# Patient Record
Sex: MALE | Race: WHITE | NOT HISPANIC OR LATINO | Employment: OTHER | URBAN - METROPOLITAN AREA
[De-identification: names, ages, dates, MRNs, and addresses within clinical notes are randomized per-mention and may not be internally consistent; named-entity substitution may affect disease eponyms.]

---

## 2023-05-27 ENCOUNTER — HOSPITAL ENCOUNTER (OUTPATIENT)
Dept: RADIOLOGY | Facility: MEDICAL CENTER | Age: 73
End: 2023-05-27

## 2023-05-27 ENCOUNTER — ANESTHESIA (OUTPATIENT)
Dept: SURGERY | Facility: MEDICAL CENTER | Age: 73
DRG: 660 | End: 2023-05-27
Payer: MEDICARE

## 2023-05-27 ENCOUNTER — HOSPITAL ENCOUNTER (INPATIENT)
Facility: MEDICAL CENTER | Age: 73
LOS: 2 days | DRG: 660 | End: 2023-05-29
Attending: STUDENT IN AN ORGANIZED HEALTH CARE EDUCATION/TRAINING PROGRAM | Admitting: STUDENT IN AN ORGANIZED HEALTH CARE EDUCATION/TRAINING PROGRAM
Payer: MEDICARE

## 2023-05-27 ENCOUNTER — ANESTHESIA EVENT (OUTPATIENT)
Dept: SURGERY | Facility: MEDICAL CENTER | Age: 73
DRG: 660 | End: 2023-05-27
Payer: MEDICARE

## 2023-05-27 ENCOUNTER — APPOINTMENT (OUTPATIENT)
Dept: RADIOLOGY | Facility: MEDICAL CENTER | Age: 73
DRG: 660 | End: 2023-05-27
Attending: UROLOGY
Payer: MEDICARE

## 2023-05-27 DIAGNOSIS — N20.1 RIGHT URETERAL STONE: ICD-10-CM

## 2023-05-27 DIAGNOSIS — N39.0 ACUTE UTI: ICD-10-CM

## 2023-05-27 LAB
ALBUMIN SERPL BCP-MCNC: 4.1 G/DL (ref 3.2–4.9)
ALBUMIN/GLOB SERPL: 1.6 G/DL
ALP SERPL-CCNC: 140 U/L (ref 30–99)
ALT SERPL-CCNC: 20 U/L (ref 2–50)
ANION GAP SERPL CALC-SCNC: 16 MMOL/L (ref 7–16)
AST SERPL-CCNC: 17 U/L (ref 12–45)
BASOPHILS # BLD AUTO: 0.1 % (ref 0–1.8)
BASOPHILS # BLD: 0.02 K/UL (ref 0–0.12)
BILIRUB SERPL-MCNC: 1.1 MG/DL (ref 0.1–1.5)
BUN SERPL-MCNC: 22 MG/DL (ref 8–22)
CALCIUM ALBUM COR SERPL-MCNC: 9.1 MG/DL (ref 8.5–10.5)
CALCIUM SERPL-MCNC: 9.2 MG/DL (ref 8.5–10.5)
CHLORIDE SERPL-SCNC: 105 MMOL/L (ref 96–112)
CO2 SERPL-SCNC: 21 MMOL/L (ref 20–33)
CREAT SERPL-MCNC: 1.17 MG/DL (ref 0.5–1.4)
EOSINOPHIL # BLD AUTO: 0.01 K/UL (ref 0–0.51)
EOSINOPHIL NFR BLD: 0.1 % (ref 0–6.9)
ERYTHROCYTE [DISTWIDTH] IN BLOOD BY AUTOMATED COUNT: 41.8 FL (ref 35.9–50)
GFR SERPLBLD CREATININE-BSD FMLA CKD-EPI: 65 ML/MIN/1.73 M 2
GLOBULIN SER CALC-MCNC: 2.6 G/DL (ref 1.9–3.5)
GLUCOSE SERPL-MCNC: 137 MG/DL (ref 65–99)
HCT VFR BLD AUTO: 45.2 % (ref 42–52)
HGB BLD-MCNC: 15.4 G/DL (ref 14–18)
IMM GRANULOCYTES # BLD AUTO: 0.05 K/UL (ref 0–0.11)
IMM GRANULOCYTES NFR BLD AUTO: 0.3 % (ref 0–0.9)
INR PPP: 1.02 (ref 0.87–1.13)
LACTATE SERPL-SCNC: 1.4 MMOL/L (ref 0.5–2)
LACTATE SERPL-SCNC: 1.5 MMOL/L (ref 0.5–2)
LACTATE SERPL-SCNC: 2.5 MMOL/L (ref 0.5–2)
LACTATE SERPL-SCNC: 3.4 MMOL/L (ref 0.5–2)
LYMPHOCYTES # BLD AUTO: 0.81 K/UL (ref 1–4.8)
LYMPHOCYTES NFR BLD: 5.6 % (ref 22–41)
MCH RBC QN AUTO: 29.8 PG (ref 27–33)
MCHC RBC AUTO-ENTMCNC: 34.1 G/DL (ref 32.3–36.5)
MCV RBC AUTO: 87.6 FL (ref 81.4–97.8)
MONOCYTES # BLD AUTO: 0.4 K/UL (ref 0–0.85)
MONOCYTES NFR BLD AUTO: 2.8 % (ref 0–13.4)
NEUTROPHILS # BLD AUTO: 13.16 K/UL (ref 1.82–7.42)
NEUTROPHILS NFR BLD: 91.1 % (ref 44–72)
NRBC # BLD AUTO: 0 K/UL
NRBC BLD-RTO: 0 /100 WBC (ref 0–0.2)
PLATELET # BLD AUTO: 203 K/UL (ref 164–446)
PMV BLD AUTO: 9 FL (ref 9–12.9)
POTASSIUM SERPL-SCNC: 4.4 MMOL/L (ref 3.6–5.5)
PROT SERPL-MCNC: 6.7 G/DL (ref 6–8.2)
PROTHROMBIN TIME: 13.3 SEC (ref 12–14.6)
RBC # BLD AUTO: 5.16 M/UL (ref 4.7–6.1)
SODIUM SERPL-SCNC: 142 MMOL/L (ref 135–145)
WBC # BLD AUTO: 14.5 K/UL (ref 4.8–10.8)

## 2023-05-27 PROCEDURE — 770006 HCHG ROOM/CARE - MED/SURG/GYN SEMI*

## 2023-05-27 PROCEDURE — A9270 NON-COVERED ITEM OR SERVICE: HCPCS | Performed by: STUDENT IN AN ORGANIZED HEALTH CARE EDUCATION/TRAINING PROGRAM

## 2023-05-27 PROCEDURE — 99100 ANES PT EXTEME AGE<1 YR&>70: CPT | Performed by: ANESTHESIOLOGY

## 2023-05-27 PROCEDURE — A9270 NON-COVERED ITEM OR SERVICE: HCPCS | Performed by: ANESTHESIOLOGY

## 2023-05-27 PROCEDURE — 99223 1ST HOSP IP/OBS HIGH 75: CPT | Mod: AI | Performed by: STUDENT IN AN ORGANIZED HEALTH CARE EDUCATION/TRAINING PROGRAM

## 2023-05-27 PROCEDURE — C1769 GUIDE WIRE: HCPCS | Performed by: UROLOGY

## 2023-05-27 PROCEDURE — 160002 HCHG RECOVERY MINUTES (STAT): Performed by: UROLOGY

## 2023-05-27 PROCEDURE — 160048 HCHG OR STATISTICAL LEVEL 1-5: Performed by: UROLOGY

## 2023-05-27 PROCEDURE — 85610 PROTHROMBIN TIME: CPT

## 2023-05-27 PROCEDURE — 99140 ANES COMP EMERGENCY COND: CPT | Performed by: ANESTHESIOLOGY

## 2023-05-27 PROCEDURE — 85025 COMPLETE CBC W/AUTO DIFF WBC: CPT

## 2023-05-27 PROCEDURE — 80053 COMPREHEN METABOLIC PANEL: CPT

## 2023-05-27 PROCEDURE — 700111 HCHG RX REV CODE 636 W/ 250 OVERRIDE (IP): Performed by: STUDENT IN AN ORGANIZED HEALTH CARE EDUCATION/TRAINING PROGRAM

## 2023-05-27 PROCEDURE — 160028 HCHG SURGERY MINUTES - 1ST 30 MINS LEVEL 3: Performed by: UROLOGY

## 2023-05-27 PROCEDURE — 700101 HCHG RX REV CODE 250: Performed by: ANESTHESIOLOGY

## 2023-05-27 PROCEDURE — 700111 HCHG RX REV CODE 636 W/ 250 OVERRIDE (IP): Performed by: ANESTHESIOLOGY

## 2023-05-27 PROCEDURE — C1747 HCHG SHELL REV 278 C1747: HCPCS | Performed by: UROLOGY

## 2023-05-27 PROCEDURE — 700102 HCHG RX REV CODE 250 W/ 637 OVERRIDE(OP): Performed by: ANESTHESIOLOGY

## 2023-05-27 PROCEDURE — 160009 HCHG ANES TIME/MIN: Performed by: UROLOGY

## 2023-05-27 PROCEDURE — 93005 ELECTROCARDIOGRAM TRACING: CPT | Performed by: UROLOGY

## 2023-05-27 PROCEDURE — 36415 COLL VENOUS BLD VENIPUNCTURE: CPT

## 2023-05-27 PROCEDURE — 83605 ASSAY OF LACTIC ACID: CPT | Mod: 91

## 2023-05-27 PROCEDURE — 160035 HCHG PACU - 1ST 60 MINS PHASE I: Performed by: UROLOGY

## 2023-05-27 PROCEDURE — 700102 HCHG RX REV CODE 250 W/ 637 OVERRIDE(OP): Performed by: STUDENT IN AN ORGANIZED HEALTH CARE EDUCATION/TRAINING PROGRAM

## 2023-05-27 PROCEDURE — 87150 DNA/RNA AMPLIFIED PROBE: CPT

## 2023-05-27 PROCEDURE — 700117 HCHG RX CONTRAST REV CODE 255: Performed by: UROLOGY

## 2023-05-27 PROCEDURE — 700105 HCHG RX REV CODE 258: Performed by: ANESTHESIOLOGY

## 2023-05-27 PROCEDURE — 700105 HCHG RX REV CODE 258: Performed by: STUDENT IN AN ORGANIZED HEALTH CARE EDUCATION/TRAINING PROGRAM

## 2023-05-27 PROCEDURE — C2617 STENT, NON-COR, TEM W/O DEL: HCPCS | Performed by: UROLOGY

## 2023-05-27 PROCEDURE — 160039 HCHG SURGERY MINUTES - EA ADDL 1 MIN LEVEL 3: Performed by: UROLOGY

## 2023-05-27 PROCEDURE — 00910 ANES TRANSURETHRAL PX NOS: CPT | Performed by: ANESTHESIOLOGY

## 2023-05-27 PROCEDURE — 87077 CULTURE AEROBIC IDENTIFY: CPT

## 2023-05-27 PROCEDURE — 87040 BLOOD CULTURE FOR BACTERIA: CPT | Mod: 91

## 2023-05-27 PROCEDURE — C1894 INTRO/SHEATH, NON-LASER: HCPCS | Performed by: UROLOGY

## 2023-05-27 PROCEDURE — 0T768DZ DILATION OF RIGHT URETER WITH INTRALUMINAL DEVICE, VIA NATURAL OR ARTIFICIAL OPENING ENDOSCOPIC: ICD-10-PCS | Performed by: UROLOGY

## 2023-05-27 DEVICE — STENT UROLOGICAL POLARIS 6X26  ULTRA: Type: IMPLANTABLE DEVICE | Site: URETER | Status: FUNCTIONAL

## 2023-05-27 RX ORDER — HYDRALAZINE HYDROCHLORIDE 20 MG/ML
20 INJECTION INTRAMUSCULAR; INTRAVENOUS EVERY 6 HOURS PRN
Status: DISCONTINUED | OUTPATIENT
Start: 2023-05-27 | End: 2023-05-29 | Stop reason: HOSPADM

## 2023-05-27 RX ORDER — OXYCODONE HCL 5 MG/5 ML
10 SOLUTION, ORAL ORAL
Status: COMPLETED | OUTPATIENT
Start: 2023-05-27 | End: 2023-05-27

## 2023-05-27 RX ORDER — SODIUM CHLORIDE, SODIUM LACTATE, POTASSIUM CHLORIDE, CALCIUM CHLORIDE 600; 310; 30; 20 MG/100ML; MG/100ML; MG/100ML; MG/100ML
INJECTION, SOLUTION INTRAVENOUS
Status: DISCONTINUED | OUTPATIENT
Start: 2023-05-27 | End: 2023-05-27 | Stop reason: SURG

## 2023-05-27 RX ORDER — TAMSULOSIN HYDROCHLORIDE 0.4 MG/1
0.4 CAPSULE ORAL
Status: DISCONTINUED | OUTPATIENT
Start: 2023-05-27 | End: 2023-05-29 | Stop reason: HOSPADM

## 2023-05-27 RX ORDER — PHENYLEPHRINE HCL IN 0.9% NACL 0.5 MG/5ML
SYRINGE (ML) INTRAVENOUS PRN
Status: DISCONTINUED | OUTPATIENT
Start: 2023-05-27 | End: 2023-05-27 | Stop reason: SURG

## 2023-05-27 RX ORDER — DIPHENHYDRAMINE HYDROCHLORIDE 50 MG/ML
12.5 INJECTION INTRAMUSCULAR; INTRAVENOUS
Status: DISCONTINUED | OUTPATIENT
Start: 2023-05-27 | End: 2023-05-27 | Stop reason: HOSPADM

## 2023-05-27 RX ORDER — ONDANSETRON 2 MG/ML
4 INJECTION INTRAMUSCULAR; INTRAVENOUS EVERY 4 HOURS PRN
Status: DISCONTINUED | OUTPATIENT
Start: 2023-05-27 | End: 2023-05-29 | Stop reason: HOSPADM

## 2023-05-27 RX ORDER — DEXAMETHASONE SODIUM PHOSPHATE 4 MG/ML
INJECTION, SOLUTION INTRA-ARTICULAR; INTRALESIONAL; INTRAMUSCULAR; INTRAVENOUS; SOFT TISSUE PRN
Status: DISCONTINUED | OUTPATIENT
Start: 2023-05-27 | End: 2023-05-27 | Stop reason: SURG

## 2023-05-27 RX ORDER — HYDROMORPHONE HYDROCHLORIDE 1 MG/ML
1 INJECTION, SOLUTION INTRAMUSCULAR; INTRAVENOUS; SUBCUTANEOUS
Status: DISCONTINUED | OUTPATIENT
Start: 2023-05-27 | End: 2023-05-29 | Stop reason: HOSPADM

## 2023-05-27 RX ORDER — AMOXICILLIN 250 MG
2 CAPSULE ORAL 2 TIMES DAILY
Status: DISCONTINUED | OUTPATIENT
Start: 2023-05-27 | End: 2023-05-29 | Stop reason: HOSPADM

## 2023-05-27 RX ORDER — CEFAZOLIN SODIUM 1 G/3ML
INJECTION, POWDER, FOR SOLUTION INTRAMUSCULAR; INTRAVENOUS PRN
Status: DISCONTINUED | OUTPATIENT
Start: 2023-05-27 | End: 2023-05-27 | Stop reason: SURG

## 2023-05-27 RX ORDER — SODIUM CHLORIDE 9 MG/ML
INJECTION, SOLUTION INTRAVENOUS CONTINUOUS
Status: DISCONTINUED | OUTPATIENT
Start: 2023-05-27 | End: 2023-05-29 | Stop reason: HOSPADM

## 2023-05-27 RX ORDER — ATORVASTATIN CALCIUM 40 MG/1
40 TABLET, FILM COATED ORAL NIGHTLY
Status: DISCONTINUED | OUTPATIENT
Start: 2023-05-27 | End: 2023-05-29 | Stop reason: HOSPADM

## 2023-05-27 RX ORDER — ACETAMINOPHEN 500 MG
1000 TABLET ORAL EVERY 4 HOURS PRN
Status: DISCONTINUED | OUTPATIENT
Start: 2023-05-27 | End: 2023-05-29 | Stop reason: HOSPADM

## 2023-05-27 RX ORDER — CARVEDILOL 3.12 MG/1
3.12 TABLET ORAL 2 TIMES DAILY WITH MEALS
Status: DISCONTINUED | OUTPATIENT
Start: 2023-05-27 | End: 2023-05-29 | Stop reason: HOSPADM

## 2023-05-27 RX ORDER — LISINOPRIL 5 MG/1
5 TABLET ORAL DAILY
Status: DISCONTINUED | OUTPATIENT
Start: 2023-05-27 | End: 2023-05-29 | Stop reason: HOSPADM

## 2023-05-27 RX ORDER — ALLOPURINOL 100 MG/1
300 TABLET ORAL DAILY
Status: DISCONTINUED | OUTPATIENT
Start: 2023-05-27 | End: 2023-05-29 | Stop reason: HOSPADM

## 2023-05-27 RX ORDER — ONDANSETRON 2 MG/ML
4 INJECTION INTRAMUSCULAR; INTRAVENOUS
Status: DISCONTINUED | OUTPATIENT
Start: 2023-05-27 | End: 2023-05-27 | Stop reason: HOSPADM

## 2023-05-27 RX ORDER — EPHEDRINE SULFATE 50 MG/ML
5 INJECTION, SOLUTION INTRAVENOUS
Status: DISCONTINUED | OUTPATIENT
Start: 2023-05-27 | End: 2023-05-27 | Stop reason: HOSPADM

## 2023-05-27 RX ORDER — LABETALOL HYDROCHLORIDE 5 MG/ML
5 INJECTION, SOLUTION INTRAVENOUS
Status: DISCONTINUED | OUTPATIENT
Start: 2023-05-27 | End: 2023-05-27 | Stop reason: HOSPADM

## 2023-05-27 RX ORDER — BISACODYL 10 MG
10 SUPPOSITORY, RECTAL RECTAL
Status: DISCONTINUED | OUTPATIENT
Start: 2023-05-27 | End: 2023-05-29 | Stop reason: HOSPADM

## 2023-05-27 RX ORDER — POLYETHYLENE GLYCOL 3350 17 G/17G
1 POWDER, FOR SOLUTION ORAL
Status: DISCONTINUED | OUTPATIENT
Start: 2023-05-27 | End: 2023-05-29 | Stop reason: HOSPADM

## 2023-05-27 RX ORDER — SODIUM CHLORIDE, SODIUM LACTATE, POTASSIUM CHLORIDE, CALCIUM CHLORIDE 600; 310; 30; 20 MG/100ML; MG/100ML; MG/100ML; MG/100ML
INJECTION, SOLUTION INTRAVENOUS CONTINUOUS
Status: DISCONTINUED | OUTPATIENT
Start: 2023-05-27 | End: 2023-05-27 | Stop reason: HOSPADM

## 2023-05-27 RX ORDER — HALOPERIDOL 5 MG/ML
1 INJECTION INTRAMUSCULAR
Status: DISCONTINUED | OUTPATIENT
Start: 2023-05-27 | End: 2023-05-27 | Stop reason: HOSPADM

## 2023-05-27 RX ORDER — OXYCODONE HCL 5 MG/5 ML
5 SOLUTION, ORAL ORAL
Status: COMPLETED | OUTPATIENT
Start: 2023-05-27 | End: 2023-05-27

## 2023-05-27 RX ORDER — LIDOCAINE HYDROCHLORIDE 20 MG/ML
INJECTION, SOLUTION EPIDURAL; INFILTRATION; INTRACAUDAL; PERINEURAL PRN
Status: DISCONTINUED | OUTPATIENT
Start: 2023-05-27 | End: 2023-05-27 | Stop reason: SURG

## 2023-05-27 RX ORDER — VASOPRESSIN 20 U/ML
INJECTION PARENTERAL PRN
Status: DISCONTINUED | OUTPATIENT
Start: 2023-05-27 | End: 2023-05-27 | Stop reason: SURG

## 2023-05-27 RX ORDER — ONDANSETRON 2 MG/ML
INJECTION INTRAMUSCULAR; INTRAVENOUS PRN
Status: DISCONTINUED | OUTPATIENT
Start: 2023-05-27 | End: 2023-05-27 | Stop reason: SURG

## 2023-05-27 RX ORDER — ONDANSETRON 4 MG/1
4 TABLET, ORALLY DISINTEGRATING ORAL EVERY 4 HOURS PRN
Status: DISCONTINUED | OUTPATIENT
Start: 2023-05-27 | End: 2023-05-29 | Stop reason: HOSPADM

## 2023-05-27 RX ORDER — MIDAZOLAM HYDROCHLORIDE 1 MG/ML
INJECTION INTRAMUSCULAR; INTRAVENOUS PRN
Status: DISCONTINUED | OUTPATIENT
Start: 2023-05-27 | End: 2023-05-27 | Stop reason: SURG

## 2023-05-27 RX ORDER — HYDRALAZINE HYDROCHLORIDE 20 MG/ML
5 INJECTION INTRAMUSCULAR; INTRAVENOUS
Status: DISCONTINUED | OUTPATIENT
Start: 2023-05-27 | End: 2023-05-27 | Stop reason: HOSPADM

## 2023-05-27 RX ADMIN — CEFAZOLIN 2 G: 1 INJECTION, POWDER, FOR SOLUTION INTRAMUSCULAR; INTRAVENOUS at 14:30

## 2023-05-27 RX ADMIN — OXYCODONE HYDROCHLORIDE 5 MG: 5 SOLUTION ORAL at 15:35

## 2023-05-27 RX ADMIN — DEXAMETHASONE SODIUM PHOSPHATE 8 MG: 4 INJECTION INTRA-ARTICULAR; INTRALESIONAL; INTRAMUSCULAR; INTRAVENOUS; SOFT TISSUE at 14:33

## 2023-05-27 RX ADMIN — Medication 200 MCG: at 14:37

## 2023-05-27 RX ADMIN — SODIUM CHLORIDE, POTASSIUM CHLORIDE, SODIUM LACTATE AND CALCIUM CHLORIDE: 600; 310; 30; 20 INJECTION, SOLUTION INTRAVENOUS at 14:24

## 2023-05-27 RX ADMIN — VASOPRESSIN 1 UNITS: 20 INJECTION INTRAVENOUS at 14:39

## 2023-05-27 RX ADMIN — SODIUM CHLORIDE: 9 INJECTION, SOLUTION INTRAVENOUS at 06:17

## 2023-05-27 RX ADMIN — FENTANYL CITRATE 50 MCG: 50 INJECTION, SOLUTION INTRAMUSCULAR; INTRAVENOUS at 14:27

## 2023-05-27 RX ADMIN — CARVEDILOL 3.12 MG: 3.12 TABLET, FILM COATED ORAL at 09:13

## 2023-05-27 RX ADMIN — MIDAZOLAM 2 MG: 1 INJECTION, SOLUTION INTRAMUSCULAR; INTRAVENOUS at 14:27

## 2023-05-27 RX ADMIN — SODIUM CHLORIDE: 9 INJECTION, SOLUTION INTRAVENOUS at 22:27

## 2023-05-27 RX ADMIN — FENTANYL CITRATE 50 MCG: 50 INJECTION, SOLUTION INTRAMUSCULAR; INTRAVENOUS at 14:47

## 2023-05-27 RX ADMIN — ONDANSETRON 4 MG: 2 INJECTION INTRAMUSCULAR; INTRAVENOUS at 10:43

## 2023-05-27 RX ADMIN — SODIUM CHLORIDE: 9 INJECTION, SOLUTION INTRAVENOUS at 17:07

## 2023-05-27 RX ADMIN — ALLOPURINOL 300 MG: 100 TABLET ORAL at 08:00

## 2023-05-27 RX ADMIN — ATORVASTATIN CALCIUM 40 MG: 40 TABLET, FILM COATED ORAL at 21:03

## 2023-05-27 RX ADMIN — HYDROMORPHONE HYDROCHLORIDE 1 MG: 1 INJECTION, SOLUTION INTRAMUSCULAR; INTRAVENOUS; SUBCUTANEOUS at 06:13

## 2023-05-27 RX ADMIN — LIDOCAINE HYDROCHLORIDE 100 MG: 20 INJECTION, SOLUTION EPIDURAL; INFILTRATION; INTRACAUDAL at 14:30

## 2023-05-27 RX ADMIN — LISINOPRIL 5 MG: 5 TABLET ORAL at 06:40

## 2023-05-27 RX ADMIN — ONDANSETRON 4 MG: 2 INJECTION INTRAMUSCULAR; INTRAVENOUS at 14:36

## 2023-05-27 RX ADMIN — SENNOSIDES AND DOCUSATE SODIUM 2 TABLET: 50; 8.6 TABLET ORAL at 17:47

## 2023-05-27 RX ADMIN — TAMSULOSIN HYDROCHLORIDE 0.4 MG: 0.4 CAPSULE ORAL at 09:13

## 2023-05-27 RX ADMIN — CARVEDILOL 3.12 MG: 3.12 TABLET, FILM COATED ORAL at 17:47

## 2023-05-27 RX ADMIN — SENNOSIDES AND DOCUSATE SODIUM 2 TABLET: 50; 8.6 TABLET ORAL at 06:16

## 2023-05-27 RX ADMIN — Medication 200 MCG: at 14:33

## 2023-05-27 RX ADMIN — PROPOFOL 150 MG: 10 INJECTION, EMULSION INTRAVENOUS at 14:30

## 2023-05-27 RX ADMIN — Medication 100 MCG: at 14:30

## 2023-05-27 ASSESSMENT — ENCOUNTER SYMPTOMS
BLURRED VISION: 0
NAUSEA: 0
DIARRHEA: 0
HEARTBURN: 0
CHILLS: 0
DOUBLE VISION: 0
COUGH: 0
NECK PAIN: 0
HEMOPTYSIS: 0
ABDOMINAL PAIN: 0
DIZZINESS: 0
CHILLS: 1
DEPRESSION: 0
HEADACHES: 0
FEVER: 0
PALPITATIONS: 0
SHORTNESS OF BREATH: 0
MYALGIAS: 0
BRUISES/BLEEDS EASILY: 0
VOMITING: 0
FLANK PAIN: 1

## 2023-05-27 ASSESSMENT — LIFESTYLE VARIABLES
ALCOHOL_USE: YES
EVER FELT BAD OR GUILTY ABOUT YOUR DRINKING: NO
HAVE YOU EVER FELT YOU SHOULD CUT DOWN ON YOUR DRINKING: NO
TOTAL SCORE: 0
EVER HAD A DRINK FIRST THING IN THE MORNING TO STEADY YOUR NERVES TO GET RID OF A HANGOVER: NO
HAVE PEOPLE ANNOYED YOU BY CRITICIZING YOUR DRINKING: NO
DOES PATIENT WANT TO STOP DRINKING: NO
CONSUMPTION TOTAL: INCOMPLETE
TOTAL SCORE: 0
TOTAL SCORE: 0

## 2023-05-27 ASSESSMENT — FIBROSIS 4 INDEX
FIB4 SCORE: 1.39
FIB4 SCORE: 1.39

## 2023-05-27 ASSESSMENT — PAIN DESCRIPTION - PAIN TYPE
TYPE: ACUTE PAIN
TYPE: ACUTE PAIN
TYPE: SURGICAL PAIN
TYPE: ACUTE PAIN
TYPE: ACUTE PAIN
TYPE: INTRACTABLE PAIN
TYPE: ACUTE PAIN
TYPE: SURGICAL PAIN

## 2023-05-27 ASSESSMENT — PATIENT HEALTH QUESTIONNAIRE - PHQ9
2. FEELING DOWN, DEPRESSED, IRRITABLE, OR HOPELESS: NOT AT ALL
SUM OF ALL RESPONSES TO PHQ9 QUESTIONS 1 AND 2: 0
1. LITTLE INTEREST OR PLEASURE IN DOING THINGS: NOT AT ALL
2. FEELING DOWN, DEPRESSED, IRRITABLE, OR HOPELESS: NOT AT ALL
SUM OF ALL RESPONSES TO PHQ9 QUESTIONS 1 AND 2: 0
1. LITTLE INTEREST OR PLEASURE IN DOING THINGS: NOT AT ALL

## 2023-05-27 NOTE — OR NURSING
1511 patient arrived from OR, report received, attached to monitoring. VSS, patient oxygenating well on 6 L mask, surgical dressings not present, internal urethral stent in place, oral airway in place    1515 oral airway dc    1535 patient co pain, medicated per EMAR    1540 telephone updates to spouse     1550 report called to [employer]    1558 patient voided 150 cc of red/pink tinged urine    1650 patient voided 100 cc of red/pink tinged urine    1656 patient transferred back to room with transport tech, vss, pain tolerable, denies nausea, patient agrees/asks questions regarding care. Safety ensured with tech, care transferred.

## 2023-05-27 NOTE — ASSESSMENT & PLAN NOTE
CT renal showing slight irregular posterior bladder wall thickening mass effect from enlarged prostate gland questioning bladder base mass recommending cystoscopy  5/27 Urology consulted and took patient for cystoscopy and stent placement

## 2023-05-27 NOTE — CARE PLAN
Problem: Knowledge Deficit - Standard  Goal: Patient and family/care givers will demonstrate understanding of plan of care, disease process/condition, diagnostic tests and medications  Note: Education provided regarding pain management including non pharmacological measures such as rest, relaxation and diversional acitivities   The patient is Stable - Low risk of patient condition declining or worsening    Shift Goals  Clinical Goals: pain management  Patient Goals: pain management  Family Goals: yandel    Progress made toward(s) clinical / shift goals:      Patient is not progressing towards the following goals:

## 2023-05-27 NOTE — PROGRESS NOTES
[facility] Medicine Daily Progress Note    Date of Service  5/27/2023    Chief Complaint  Warren Coyne is a 73 y.o. male admitted 5/27/2023 with flank pain.    Hospital Course  Warren Coyne is a 73 y.o. male past medical history of CAD status post CABG on aspirin/Plavix, hypertension who presented 5/27/2023 with right flank pain that started around 4 PM while on plane supposed to be visiting [facility] brought in to [facility].  Patient reports having prostate issues and has incomplete bladder emptying.  He denies any fevers, chills, nausea or vomiting.  Denies any blood in the urine.  No other relieving or exacerbate factors are noted.      In the ER CT renal study done at outside facility showing right 8 mm proximal ureteral stone and slight irregularity posterior bladder wall thickening and mass effect of enlarged prostate gland questioning bladder base mass recommending cystoscopy by radiology read.  Urinalysis positive for infection nitrate positive given Rocephin prior to arrival.  He is hemodynamically stable.  WBC outside facility 13.8.  Discussed with urology on-call plan for stent insertion in a.m.  We will keep patient n.p.o.  Admitted to medicine service.    Interval Problem Update  5/27 T-max 99, vitals are stable, on 2 L nasal cannula.  Patient has developed chills and does have slight fever of 99.  White count is slightly elevated at 13.  Continue IV Rocephin and monitor cultures.  Lactic acid is 2.5.    I have discussed this patient's plan of care and discharge plan at IDT rounds today with Case Management, Nursing, Nursing leadership, and other members of the IDT team.    Consultants/Specialty  urology    Code Status  Full Code    Disposition  The patient is not medically cleared for discharge to home or a post-acute facility.  Anticipate discharge to: home with close outpatient follow-up    I have placed the appropriate orders for post-discharge needs.    Review of  Systems  Review of Systems   Constitutional:  Positive for chills. Negative for fever and malaise/fatigue.   Respiratory:  Negative for cough and shortness of breath.    Cardiovascular:  Negative for chest pain, palpitations and leg swelling.   Gastrointestinal:  Negative for abdominal pain, diarrhea, heartburn, nausea and vomiting.   Genitourinary:  Positive for flank pain (improved). Negative for dysuria, frequency and urgency.   Neurological:  Negative for dizziness and headaches.   All other systems reviewed and are negative.       Physical Exam  Temp:  [36.7 °C (98 °F)-37.2 °C (99 °F)] 37.2 °C (99 °F)  Pulse:  [52-81] 81  Resp:  [15-18] 17  BP: (121-186)/() 121/64  SpO2:  [85 %-99 %] 91 %    Physical Exam  Vitals and nursing note reviewed.   Constitutional:       General: He is awake.      Appearance: Normal appearance. He is not ill-appearing.   HENT:      Head: Normocephalic and atraumatic.      Jaw: There is normal jaw occlusion.      Right Ear: Hearing normal.      Left Ear: Hearing normal.      Nose: Nose normal.      Mouth/Throat:      Lips: Pink.      Mouth: Mucous membranes are moist.   Eyes:      Extraocular Movements: Extraocular movements intact.      Conjunctiva/sclera: Conjunctivae normal.      Pupils: Pupils are equal, round, and reactive to light.   Neck:      Vascular: No carotid bruit.   Cardiovascular:      Rate and Rhythm: Normal rate and regular rhythm.      Pulses: Normal pulses.      Heart sounds: Normal heart sounds, S1 normal and S2 normal.   Pulmonary:      Effort: Pulmonary effort is normal.      Breath sounds: Normal breath sounds and air entry. No stridor.   Abdominal:      General: Bowel sounds are normal.      Palpations: Abdomen is soft.      Tenderness: There is no abdominal tenderness. There is right CVA tenderness.   Musculoskeletal:      Cervical back: Normal range of motion and neck supple.      Right lower leg: No edema.      Left lower leg: No edema.   Skin:      General: Skin is warm and dry.      Capillary Refill: Capillary refill takes less than 2 seconds.   Neurological:      General: No focal deficit present.      Mental Status: He is alert and oriented to person, place, and time. Mental status is at baseline.      Sensory: Sensation is intact.      Motor: Motor function is intact.   Psychiatric:         Attention and Perception: Attention and perception normal.         Mood and Affect: Mood and affect normal.         Speech: Speech normal.         Behavior: Behavior normal. Behavior is cooperative.         Fluids  No intake or output data in the 24 hours ending 05/27/23 0915    Laboratory  Recent Labs     05/26/23 2218 05/27/23  0904   WBC 13.8* 14.5*   RBC 5.55 5.16   HEMOGLOBIN 16.0 15.4   HEMATOCRIT 46.6 45.2   MCV 84.0 87.6   MCH 28.8 29.8   MCHC 34.3 34.1   RDW 13.1 41.8   PLATELETCT 273 203   MPV 9.2 9.0     Recent Labs     05/26/23 2218   SODIUM 139   POTASSIUM 3.8   CHLORIDE 106   CO2 21*   GLUCOSE 145*   BUN 19   CREATININE 0.9   CALCIUM 10.0                   Imaging  CT-FOREIGN FILM CAT SCAN   Final Result           Assessment/Plan  * Right ureteral stone  Assessment & Plan  Approximately right proximal 8 mm stone resulting in moderate obstruction  Received Rocephin prior to arrival.  UA nitrate positive continue with IV Rocephin  Keep n.p.o.  Discussed with urology on-call plan for cystoscopy and stent placement in a.m.  IV narcotics  Hold aspirin/Plavix    Acute UTI  Assessment & Plan  Rocephin q24 hourly   F/u cultures    Bladder wall thickening  Assessment & Plan  CT renal showing slight irregular posterior bladder wall thickening mass effect from enlarged prostate gland questioning bladder base mass recommending cystoscopy  Urology consulted and aware plan for cystoscopy    Coronary artery disease  Assessment & Plan  Coronary artery disease status post CABG   Hold dual antiplatelet therapy  Continue with Coreg and  lisinopril      Hypertension  Assessment & Plan  Resume Coreg and lisinopril  IV hydralazine as needed    Obesity (BMI 30-39.9)  Assessment & Plan  BMI 34.47           VTE prophylaxis: therapeutic anticoagulation with plavix/asa    I have performed a physical exam and reviewed and updated ROS and Plan today (5/27/2023). In review of yesterday's note (5/26/2023), there are no changes except as documented above.

## 2023-05-27 NOTE — ANESTHESIA PREPROCEDURE EVALUATION
Case: [id number] Date/Time: 05/27/23 1306    Procedures:       CYSTOSCOPY      LITHOTRIPSY, USING LASER      CYSTOSCOPY, WITH URETERAL STENT INSERTION (Right)    Location: [facility] OR 18 / SURGERY [facility]    Surgeons: Michel Gibson M.D.          Relevant Problems   CARDIAC   (positive) Coronary artery disease   (positive) Hypertension      Other   (positive) Obesity (BMI 30-39.9)   (positive) Right ureteral stone   CABG 2019   METs>4 denies CP/SOB    Physical Exam    Airway   Mallampati: II  TM distance: >3 FB  Neck ROM: full       Cardiovascular - normal exam  Rhythm: regular  Rate: normal  (-) murmur     Dental - normal exam           Pulmonary - normal exam  Breath sounds clear to auscultation     Abdominal    Neurological - normal exam                 Anesthesia Plan    ASA 3- EMERGENT (obstructing stone. )   ASA physical status 3 criteria: CAD/stents (> 3 months)    Plan - general       Airway plan will be LMA          Induction: intravenous    Postoperative Plan: Postoperative administration of opioids is intended.    Pertinent diagnostic labs and testing reviewed    Informed Consent:    Anesthetic plan and risks discussed with patient.    Use of blood products discussed with: patient whom consented to blood products.

## 2023-05-27 NOTE — PROGRESS NOTES
Urology Pre-op Note:  74 yo M with 8mm R UPJ stone with intractable pain.    Plan:  OR for cystoscopy, R ureteroscopy with laser litho and stent.

## 2023-05-27 NOTE — ANESTHESIA PROCEDURE NOTES
Airway    Date/Time: 5/27/2023 2:30 PM    Performed by: Aidan Rodgers M.D.  Authorized by: Aidan Rodgers M.D.    Location:  OR  Urgency:  Elective  Difficult Airway: No    Indications for Airway Management:  Anesthesia      Spontaneous Ventilation: absent    Sedation Level:  Deep  Preoxygenated: Yes    Mask Difficulty Assessment:  0 - not attempted  Final Airway Type:  Supraglottic airway  Final Supraglottic Airway:  Standard LMA    SGA Size:  5  Number of Attempts at Approach:  1

## 2023-05-27 NOTE — PROGRESS NOTES
Patient received in bed she is alert and oriented x4. He is ambulatory. Bed kept in lowest position. Kept on NPO, instructions given to patient, verbalized understanding. All needs attended. Will continue to monitor

## 2023-05-27 NOTE — HOSPITAL COURSE
Warren Coyne is a 73 y.o. male past medical history of CAD status post CABG on aspirin/Plavix, hypertension who presented 5/27/2023 with right flank pain that started around 4 PM while on plane supposed to be visiting [facility] brought in to [facility].  Patient reports having prostate issues and has incomplete bladder emptying.  He denies any fevers, chills, nausea or vomiting.  Denies any blood in the urine.  No other relieving or exacerbate factors are noted.      In the ER CT renal study done at outside facility showing right 8 mm proximal ureteral stone and slight irregularity posterior bladder wall thickening and mass effect of enlarged prostate gland questioning bladder base mass recommending cystoscopy by radiology read.  Urinalysis positive for infection nitrate positive given Rocephin prior to arrival.  He is hemodynamically stable.  WBC outside facility 13.8.  Discussed with urology on-call plan for stent insertion in a.m.  We will keep patient n.p.o.  Admitted to medicine service.    Urology took patient for procedure on the morning of admission, they were unable to remove the stone but they did place a stent.  On the following day his white count jumped to 23 and his blood cultures (1 set) grew gram-positive cocci.  Discussed the case with infectious disease who recommended redrawing blood cultures and this was most likely a contaminant.  The following day white count trended down and the patient was feeling much better.  He will be sent home on 7 days of Omnicef and follow-up with urology in [place].  He will also be sent home with a few days of pain medications and will continue his home Flomax.

## 2023-05-27 NOTE — PROGRESS NOTES
4 Eyes Skin Assessment Completed by [employer] and LUCIO Ma.    Head WDL  Ears WDL  Nose WDL  Mouth WDL  Neck WDL  Breast/Chest WDL  Shoulder Blades WDL  Spine WDL  (R) Arm/Elbow/Hand WDL  (L) Arm/Elbow/Hand WDL  Abdomen WDL  Groin WDL  Scrotum/Coccyx/Buttocks WDL  (R) Leg WDL  (L) Leg WDL  (R) Heel/Foot/Toe WDL  (L) Heel/Foot/Toe WDL          Devices In Places       Interventions In Place N/A    Possible Skin Injury No    Pictures Uploaded Into Epic N/A  Wound Consult Placed N/A  RN Wound Prevention Protocol Ordered No

## 2023-05-27 NOTE — OR SURGEON
Immediate Post OP Note    PreOp Diagnosis: R ureteral stone    PostOp Diagnosis: R ureteral stone, R ureteral stricture    Procedure(s):  URETEROSCOPY - Wound Class: Clean Contaminated  CYSTOSCOPY, WITH RIGHT URETERAL STENT INSERTION - Wound Class: Clean Contaminated    Surgeon(s):  Michel Gibson M.D.    Anesthesiologist/Type of Anesthesia:  Anesthesiologist: Aidan Rodgers M.D./General    Surgical Staff:  Circulator: Rica Aldana R.N.; Nely Chavira R.N.  Laser Staff: Jc Schmitt  Operating Room Assistant (ORA): CINTHIA JAIN  Scrub Person: Sabino Frazier; Lan Nagel    Specimens removed if any:  * No specimens in log *    Estimated Blood Loss: 25ml    Findings: stricture in mid R ureter - unable to pass ureteroscope beyond this, so stent placed    Complications: none    Drain: [id number] R ureteral stent (no string)    5/27/2023 3:09 PM Michel Gibson M.D.

## 2023-05-27 NOTE — ASSESSMENT & PLAN NOTE
Approximately right proximal 8 mm stone resulting in moderate obstruction  Received Rocephin prior to arrival.  UA nitrate positive continue with IV Rocephin.   Urine culture brewing.  5/27 Urology took patient for cystoscopy and stent placement  5/28 Restarted asa/plavix

## 2023-05-27 NOTE — H&P
Hospital Medicine History & Physical Note    Date of Service  5/27/2023    Primary Care Physician  Pcp Not In Computer    Consultants  urology    Specialist Names: Dr. Gibson    Code Status  Full Code    Chief Complaint  8mm R ureteral prox stone, UA +      History of Presenting Illness  Warren Coyne is a 73 y.o. male past medical history of CAD status post CABG on aspirin/Plavix, hypertension who presented 5/27/2023 with right flank pain that started around 4 PM while on plane supposed to be visiting [facility] brought in to [facility].  Patient reports having prostate issues and has incomplete bladder emptying.  He denies any fevers, chills, nausea or vomiting.  Denies any blood in the urine.  No other relieving or exacerbate factors are noted.  In the ER CT renal study done at outside facility showing right 8 mm proximal ureteral stone and slight irregularity posterior bladder wall thickening and mass effect of enlarged prostate gland questioning bladder base mass recommending cystoscopy by radiology read.  Urinalysis positive for infection nitrate positive given Rocephin prior to arrival.  He is hemodynamically stable.  WBC outside facility 13.8.  Discussed with urology on-call plan for stent insertion in a.m.  We will keep patient n.p.o.  Admitted to medicine service.    I discussed the plan of care with patient, bedside RN, and er physician at outside facility and urology at [facility] .    Review of Systems  Review of Systems   Constitutional:  Negative for chills and fever.   HENT:  Negative for hearing loss and tinnitus.    Eyes:  Negative for blurred vision and double vision.   Respiratory:  Negative for cough and hemoptysis.    Cardiovascular:  Negative for chest pain and palpitations.   Gastrointestinal:  Negative for heartburn and nausea.   Genitourinary:  Positive for flank pain. Negative for dysuria and urgency.   Musculoskeletal:  Negative for myalgias and neck pain.    Skin:  Negative for rash.   Neurological:  Negative for dizziness and headaches.   Endo/Heme/Allergies:  Does not bruise/bleed easily.   Psychiatric/Behavioral:  Negative for depression and suicidal ideas.        Past Medical History   has a past medical history of CAD (coronary artery disease) and Hypertension.    Surgical History  CAD status post CABG    Family History    Family history reviewed with patient. There is no family history that is pertinent to the chief complaint.     Social History   reports that he has never smoked. He has never used smokeless tobacco.    Allergies  No Known Allergies    Medications  Prior to Admission Medications   Prescriptions Last Dose Informant Patient Reported? Taking?   allopurinol (ZYLOPRIM) 300 MG Tab 5/26/2023 at 1000  Yes No   Sig: Take 300 mg by mouth every day.   aspirin (ASA) 81 MG Chew Tab chewable tablet 5/26/2023 at 1000  Yes No   Sig: Chew 81 mg every day.   atorvastatin (LIPITOR) 20 MG Tab 5/26/2023 at 1000  Yes No   Sig: Take 40 mg by mouth every evening.   carvedilol (COREG) 3.125 MG Tab 5/26/2023 at 1000  Yes No   Sig: Take 3.125 mg by mouth 2 times a day with meals.   clopidogrel (PLAVIX) 75 MG Tab 5/26/2023 at 1000  Yes No   Sig: Take 75 mg by mouth every day.   lisinopril (PRINIVIL) 5 MG Tab 5/26/2023 at 1000  Yes No   Sig: Take 5 mg by mouth every day.   tamsulosin (FLOMAX) 0.4 MG capsule 5/26/2023 at 1000  Yes No   Sig: Take 0.4 mg by mouth 1/2 hour after breakfast.      Facility-Administered Medications: None       Physical Exam  Temp:  [36.7 °C (98 °F)-36.7 °C (98.1 °F)] 36.7 °C (98 °F)  Pulse:  [52-70] 60  Resp:  [16-18] 17  BP: (135-186)/() 186/94  SpO2:  [95 %-99 %] 97 %  Blood Pressure : (!) 186/94 (RN notified)   Temperature: 36.7 °C (98 °F)   Pulse: 60   Respiration: 17   Pulse Oximetry: 97 %       Physical Exam  Vitals and nursing note reviewed.   Constitutional:       Appearance: Normal appearance.   HENT:      Head: Normocephalic and  atraumatic.      Right Ear: Tympanic membrane normal.      Left Ear: Tympanic membrane normal.      Nose: Nose normal.      Mouth/Throat:      Mouth: Mucous membranes are moist.      Pharynx: Oropharynx is clear.   Eyes:      Extraocular Movements: Extraocular movements intact.      Pupils: Pupils are equal, round, and reactive to light.   Cardiovascular:      Rate and Rhythm: Normal rate and regular rhythm.      Pulses: Normal pulses.      Heart sounds: Normal heart sounds.   Pulmonary:      Effort: Pulmonary effort is normal.      Breath sounds: Normal breath sounds.   Abdominal:      General: Bowel sounds are normal. There is no distension.      Palpations: Abdomen is soft. There is no mass.      Tenderness: There is left CVA tenderness.   Musculoskeletal:         General: No swelling or tenderness. Normal range of motion.      Cervical back: Neck supple.   Skin:     General: Skin is warm.      Capillary Refill: Capillary refill takes less than 2 seconds.      Coloration: Skin is not jaundiced or pale.   Neurological:      General: No focal deficit present.      Mental Status: He is alert and oriented to person, place, and time. Mental status is at baseline.   Psychiatric:         Mood and Affect: Mood normal.         Behavior: Behavior normal.         Laboratory:  Recent Labs     05/26/23 2218   WBC 13.8*   RBC 5.55   HEMOGLOBIN 16.0   HEMATOCRIT 46.6   MCV 84.0   MCH 28.8   MCHC 34.3   RDW 13.1   PLATELETCT 273   MPV 9.2     Recent Labs     05/26/23 2218   SODIUM 139   POTASSIUM 3.8   CHLORIDE 106   CO2 21*   GLUCOSE 145*   BUN 19   CREATININE 0.9   CALCIUM 10.0     Recent Labs     05/26/23  2218   ALTSGPT 32   ASTSGOT 29   ALKPHOSPHAT 158*   TBILIRUBIN 1.0   LIPASE 90   GLUCOSE 145*         No results for input(s): NTPROBNP in the last 72 hours.      No results for input(s): TROPONINT in the last 72 hours.    Imaging:    I have independently reviewed the CT renal study showing moderate proximal obstructive  uropathy and right secondary to ovoid calculus proximal right ureter.  Bladder wall thickening recommending cystoscopy to rule out bladder mass    See official read from outside facility  CT-FOREIGN FILM CAT SCAN   Final Result          no X-Ray or EKG requiring interpretation    Assessment/Plan:  Justification for Admission Status  I anticipate this patient will require at least two midnights for appropriate medical management, necessitating inpatient admission because proximal right 8 mm ureteral stone, urinary tract infection requiring urology consultation and stent placement.    Patient will need a Med/Surg bed on MEDICAL service .  The need is secondary to see above.    * Right ureteral stone  Assessment & Plan  Approximately right proximal 8 mm stone resulting in moderate obstruction  Received Rocephin prior to arrival.  UA nitrate positive continue with IV Rocephin  Keep n.p.o.  Discussed with urology on-call plan for cystoscopy and stent placement in a.m.  IV narcotics  Hold aspirin/Plavix    Acute UTI  Assessment & Plan  Rocephin q24 hourly   F/u cultures    Bladder wall thickening  Assessment & Plan  CT renal showing slight irregular posterior bladder wall thickening mass effect from enlarged prostate gland questioning bladder base mass recommending cystoscopy  Urology consulted and aware plan for cystoscopy    Obesity (BMI 30-39.9)  Assessment & Plan  BMI 34.47      Coronary artery disease  Assessment & Plan  Coronary artery disease status post CABG   Hold dual antiplatelet therapy  Continue with Coreg and lisinopril      Hypertension  Assessment & Plan  Resume Coreg and lisinopril  IV hydralazine as needed      I independently reviewed pertinent clinical lab tests since admission and ordered additional follow up clinical lab tests.  I independently reviewed pertinent radiographic images and the radiologist's reports since admission and ordered additional follow up radiographic studies.  The details of  the available patient records in [facility] (including laboratory tests, culture data, medications, imaging, and other pertinent diagnostic tests) and that information was utilized as warranted in today's medical decision making for this patient.  I personally evaluated the patient condition at bedside.     Care interventions include:   Review of interval medical and surgical history, current medications and outpatient medication reconciliation, interval imaging studies and radiologist interpretation, and interval laboratory values.     Aggregated care time spent evaluating, reassessing, reviewing documentation, providing care, and managing this patient exclusive of procedures: 75 minutes         VTE prophylaxis: SCDs/TEDs

## 2023-05-27 NOTE — ANESTHESIA TIME REPORT
Anesthesia Start and Stop Event Times     Date Time Event    5/27/2023 1306 Ready for Procedure     1424 Anesthesia Start     1513 Anesthesia Stop        Responsible Staff  05/27/23    Name Role Begin End    Aidan Rodgers M.D. Anesth 1424 1513        Overtime Reason:  no overtime (within assigned shift)    Comments:

## 2023-05-27 NOTE — ASSESSMENT & PLAN NOTE
Coronary artery disease status post CABG   Hold dual antiplatelet therapy  Continue with Coreg and lisinopril

## 2023-05-27 NOTE — PROGRESS NOTES
Brief transfer acceptance note  73-year-old male from [place] presents with flank pain found to have right 8 mm proximal ureteral stone with perinephric stranding.  Urinalysis nitrite positive.  Patient given ceftriaxone.  WBC 13.8.  High level of care requested for urology consultation.  Patient is hemodynamically stable.  Accepted to inpatient MedSurg.  Full code.    Please reach page RTOC once patient arrives to floor so hospitalist can be assigned for orders and examination.

## 2023-05-28 LAB
ANION GAP SERPL CALC-SCNC: 13 MMOL/L (ref 7–16)
BUN SERPL-MCNC: 26 MG/DL (ref 8–22)
CALCIUM SERPL-MCNC: 8.4 MG/DL (ref 8.5–10.5)
CHLORIDE SERPL-SCNC: 105 MMOL/L (ref 96–112)
CO2 SERPL-SCNC: 20 MMOL/L (ref 20–33)
CREAT SERPL-MCNC: 1.09 MG/DL (ref 0.5–1.4)
EKG IMPRESSION: NORMAL
ERYTHROCYTE [DISTWIDTH] IN BLOOD BY AUTOMATED COUNT: 42.1 FL (ref 35.9–50)
GFR SERPLBLD CREATININE-BSD FMLA CKD-EPI: 71 ML/MIN/1.73 M 2
GLUCOSE SERPL-MCNC: 153 MG/DL (ref 65–99)
HCT VFR BLD AUTO: 39 % (ref 42–52)
HGB BLD-MCNC: 13.4 G/DL (ref 14–18)
MCH RBC QN AUTO: 29.8 PG (ref 27–33)
MCHC RBC AUTO-ENTMCNC: 34.4 G/DL (ref 32.3–36.5)
MCV RBC AUTO: 86.7 FL (ref 81.4–97.8)
PLATELET # BLD AUTO: 177 K/UL (ref 164–446)
PMV BLD AUTO: 9.8 FL (ref 9–12.9)
POTASSIUM SERPL-SCNC: 4.4 MMOL/L (ref 3.6–5.5)
RBC # BLD AUTO: 4.5 M/UL (ref 4.7–6.1)
SODIUM SERPL-SCNC: 138 MMOL/L (ref 135–145)
WBC # BLD AUTO: 23 K/UL (ref 4.8–10.8)

## 2023-05-28 PROCEDURE — 87086 URINE CULTURE/COLONY COUNT: CPT

## 2023-05-28 PROCEDURE — 87040 BLOOD CULTURE FOR BACTERIA: CPT

## 2023-05-28 PROCEDURE — 36415 COLL VENOUS BLD VENIPUNCTURE: CPT

## 2023-05-28 PROCEDURE — 700111 HCHG RX REV CODE 636 W/ 250 OVERRIDE (IP): Performed by: STUDENT IN AN ORGANIZED HEALTH CARE EDUCATION/TRAINING PROGRAM

## 2023-05-28 PROCEDURE — 85027 COMPLETE CBC AUTOMATED: CPT

## 2023-05-28 PROCEDURE — 700102 HCHG RX REV CODE 250 W/ 637 OVERRIDE(OP)

## 2023-05-28 PROCEDURE — 93010 ELECTROCARDIOGRAM REPORT: CPT | Performed by: INTERNAL MEDICINE

## 2023-05-28 PROCEDURE — 700101 HCHG RX REV CODE 250: Performed by: STUDENT IN AN ORGANIZED HEALTH CARE EDUCATION/TRAINING PROGRAM

## 2023-05-28 PROCEDURE — 700101 HCHG RX REV CODE 250

## 2023-05-28 PROCEDURE — A9270 NON-COVERED ITEM OR SERVICE: HCPCS | Performed by: STUDENT IN AN ORGANIZED HEALTH CARE EDUCATION/TRAINING PROGRAM

## 2023-05-28 PROCEDURE — 770006 HCHG ROOM/CARE - MED/SURG/GYN SEMI*

## 2023-05-28 PROCEDURE — 700105 HCHG RX REV CODE 258: Performed by: STUDENT IN AN ORGANIZED HEALTH CARE EDUCATION/TRAINING PROGRAM

## 2023-05-28 PROCEDURE — 99232 SBSQ HOSP IP/OBS MODERATE 35: CPT

## 2023-05-28 PROCEDURE — A9270 NON-COVERED ITEM OR SERVICE: HCPCS

## 2023-05-28 PROCEDURE — 80048 BASIC METABOLIC PNL TOTAL CA: CPT

## 2023-05-28 PROCEDURE — 700102 HCHG RX REV CODE 250 W/ 637 OVERRIDE(OP): Performed by: STUDENT IN AN ORGANIZED HEALTH CARE EDUCATION/TRAINING PROGRAM

## 2023-05-28 RX ORDER — ASPIRIN 81 MG/1
81 TABLET, CHEWABLE ORAL DAILY
Status: DISCONTINUED | OUTPATIENT
Start: 2023-05-28 | End: 2023-05-29 | Stop reason: HOSPADM

## 2023-05-28 RX ORDER — CLOPIDOGREL BISULFATE 75 MG/1
75 TABLET ORAL DAILY
Status: DISCONTINUED | OUTPATIENT
Start: 2023-05-28 | End: 2023-05-29 | Stop reason: HOSPADM

## 2023-05-28 RX ADMIN — POLYETHYLENE GLYCOL-3350 AND ELECTROLYTES 4 L: 236; 6.74; 5.86; 2.97; 22.74 POWDER, FOR SOLUTION ORAL at 10:10

## 2023-05-28 RX ADMIN — ASPIRIN 81 MG 81 MG: 81 TABLET ORAL at 09:50

## 2023-05-28 RX ADMIN — SODIUM CHLORIDE: 9 INJECTION, SOLUTION INTRAVENOUS at 10:14

## 2023-05-28 RX ADMIN — SENNOSIDES AND DOCUSATE SODIUM 2 TABLET: 50; 8.6 TABLET ORAL at 05:18

## 2023-05-28 RX ADMIN — ALLOPURINOL 300 MG: 100 TABLET ORAL at 05:18

## 2023-05-28 RX ADMIN — LISINOPRIL 5 MG: 5 TABLET ORAL at 05:18

## 2023-05-28 RX ADMIN — TAMSULOSIN HYDROCHLORIDE 0.4 MG: 0.4 CAPSULE ORAL at 08:29

## 2023-05-28 RX ADMIN — ATORVASTATIN CALCIUM 40 MG: 40 TABLET, FILM COATED ORAL at 20:42

## 2023-05-28 RX ADMIN — MAGNESIUM HYDROXIDE 30 ML: 400 SUSPENSION ORAL at 08:34

## 2023-05-28 RX ADMIN — CARVEDILOL 3.12 MG: 3.12 TABLET, FILM COATED ORAL at 08:29

## 2023-05-28 RX ADMIN — CARVEDILOL 3.12 MG: 3.12 TABLET, FILM COATED ORAL at 16:59

## 2023-05-28 RX ADMIN — CLOPIDOGREL BISULFATE 75 MG: 75 TABLET ORAL at 09:50

## 2023-05-28 RX ADMIN — CEFTRIAXONE SODIUM 1000 MG: 10 INJECTION, POWDER, FOR SOLUTION INTRAVENOUS at 01:43

## 2023-05-28 ASSESSMENT — ENCOUNTER SYMPTOMS
CHILLS: 0
DIZZINESS: 0
FLANK PAIN: 1
COUGH: 0
CHILLS: 1
HEARTBURN: 0
DIARRHEA: 0
VOMITING: 0
ABDOMINAL PAIN: 0
NAUSEA: 0
HEADACHES: 0
FEVER: 0
SHORTNESS OF BREATH: 0
PALPITATIONS: 0

## 2023-05-28 ASSESSMENT — PATIENT HEALTH QUESTIONNAIRE - PHQ9
SUM OF ALL RESPONSES TO PHQ9 QUESTIONS 1 AND 2: 0
2. FEELING DOWN, DEPRESSED, IRRITABLE, OR HOPELESS: NOT AT ALL
1. LITTLE INTEREST OR PLEASURE IN DOING THINGS: NOT AT ALL

## 2023-05-28 ASSESSMENT — PAIN DESCRIPTION - PAIN TYPE: TYPE: SURGICAL PAIN;INTRACTABLE PAIN

## 2023-05-28 NOTE — CARE PLAN
The patient is Stable - Low risk of patient condition declining or worsening    Shift Goals  Clinical Goals: IV ATB  Patient Goals: pain control  Family Goals: yandel      Problem: Knowledge Deficit - Standard  Goal: Patient and family/care givers will demonstrate understanding of plan of care, disease process/condition, diagnostic tests and medications  Outcome: Progressing     Problem: Pain - Standard  Goal: Alleviation of pain or a reduction in pain to the patient’s comfort goal  Outcome: Progressing

## 2023-05-28 NOTE — PROGRESS NOTES
Note to reader: this note follows the APSO format rather than the historical SOAP format. Assessment and plan located at the top of the note for ease of use.    Chief Complaint  73 y.o. year old male here with right UPJ stone and UTI.     Assessment/Plan  Interval History   Right UPJ calculus s/p right ureteral stent placement 5/27/23  UTI  Bacteremia        Ureteral stent to remain  Antibiotics per hospital team pending culture  Will need eventual right CULTS. Lives in [place] and plans to re-establish with his Urologist there with desired time frame for stone intervention of 2-3 weeks following treatment of infection. Please provide Hospital records at discharge (CT scan, op report)  Nothing further from  standpoint. Urology signing off.         Case discussed with patient, wife and Urology-Dr. Paige KELSEY  Patient seen and examined    5/28. Right ureteral stent placement yesterday. Unable to access stone due to tight ureter. Has some stent bother (dysuria, frequency). Early growth in blood culture. On Rocephin. Afebrile. WBC 23. Creat normal           Review of Systems  Physical Exam   Review of Systems   Constitutional:  Negative for chills and fever.   Gastrointestinal:  Negative for nausea and vomiting.   Genitourinary:  Positive for dysuria, flank pain, frequency, hematuria and urgency.     Vitals:    05/27/23 1927 05/28/23 0503 05/28/23 0743 05/28/23 1200   BP: 125/63 127/67 124/68    Pulse: 70 64 (!) 53    Resp: 18 20 19    Temp: 37.4 °C (99.4 °F) 36.2 °C (97.2 °F) 36.6 °C (97.8 °F)    TempSrc: Temporal Temporal Temporal    SpO2: 94% 95% 97% 95%   Weight:         Physical Exam  Vitals and nursing note reviewed.   Constitutional:       Appearance: Normal appearance.   Pulmonary:      Effort: Pulmonary effort is normal.   Neurological:      General: No focal deficit present.      Mental Status: He is alert.          Hematology Chemistry   Lab Results   Component Value Date/Time    WBC 23.0 (H) 05/28/2023  07:22 AM    HEMOGLOBIN 13.4 (L) 05/28/2023 07:22 AM    HEMATOCRIT 39.0 (L) 05/28/2023 07:22 AM    PLATELETCT 177 05/28/2023 07:22 AM     Lab Results   Component Value Date/Time    SODIUM 138 05/28/2023 07:22 AM    POTASSIUM 4.4 05/28/2023 07:22 AM    CHLORIDE 105 05/28/2023 07:22 AM    CO2 20 05/28/2023 07:22 AM    GLUCOSE 153 (H) 05/28/2023 07:22 AM    BUN 26 (H) 05/28/2023 07:22 AM    CREATININE 1.09 05/28/2023 07:22 AM         Labs not explicitly included in this progress note were reviewed by the author.   Radiology/imaging not explicitly included in this progress note was reviewed by the author.     Labs reviewed and Medications reviewed

## 2023-05-28 NOTE — OP REPORT
DATE OF SERVICE:  05/27/2023     PREOPERATIVE DIAGNOSIS:  Right ureteral stone.     POSTOPERATIVE DIAGNOSES:  Right ureteral stone and right ureteral stricture.     PROCEDURES PERFORMED:  Cystoscopy, right ureteroscopy and right ureteral stent   placement.     SURGEON:  Michel Gibson MD     ANESTHESIOLOGIST:  Aidan Rodgers MD     ANESTHESIA:  General.     INDICATIONS FOR PROCEDURE:  The patient is a 73-year-old male admitted with   intractable pain from an 8 mm right proximal ureteral stone.  After discussing   treatment options, he has elected for right ureteroscopy with laser   lithotripsy and stent placement.     DESCRIPTION OF PROCEDURE:  After obtaining informed consent, the patient was   brought to the operating room.  After the induction of general anesthesia, he   was positioned in dorsal lithotomy position and his genitalia were prepped and   draped in sterile fashion.  He received Ancef as antibiotic prophylaxis prior   to starting the procedure.  I passed a 20-Luxembourgish cystoscope per urethra into   the bladder under direct vision.  The prostate was moderately obstructive   approximately 4 cm in length.  There was some debris in the bladder that was   drained out.  Otherwise, the bladder appeared normal.  I was able to pass two   sensor wires up the right ureter under fluoroscopic guidance and then I   attempted to pass an 11/13 Luxembourgish ureteral access sheath up, but it only   passed up the distal third of the ureter due to a tight ureter.  I was then   able to pass a flexible ureteroscope up through that sheath up to the proximal   third of the ureter where there was a focal narrowing of that ureter that   would not allow the scope to pass beyond that. I elected not to laser that   focal narrowing of the ureter due to him being on Plavix, so I did a   retrograde pyelogram through the ureteroscope to outline the renal pelvis and   calices for stent placement and then passed up a 6-Luxembourgish x 26 cm stent  by   backloading through the cystoscope until it was seen to coil in the right   renal pelvis under fluoroscopy as well as in the bladder under direct vision.    There was good efflux through the stent.  The scope was then used to empty   his bladder and then removed.  The patient was then awoken from anesthesia and   taken to recovery room in stable condition.     COMPLICATIONS:  None.     ESTIMATED BLOOD LOSS:  25 mL     SPECIMENS:  None.     DRAINS:  A 6-Portuguese x 26 cm right ureteral stent.        ______________________________  MD AMRIT Puente/BALTA    DD:  05/27/2023 15:13  DT:  05/27/2023 17:29    Job#:  [id number]

## 2023-05-28 NOTE — CONSULTS
DATE OF SERVICE:  05/27/2023     UROLOGY CONSULTATION     REQUESTING PHYSICIAN:  Kobe Deleon MD with the hospitalist service.     CONSULTING PHYSICIAN:  Michel Gibson MD with Urology.     REASON FOR CONSULTATION:  Right ureteral stone.     HISTORY OF PRESENT ILLNESS:  The patient is a 73-year-old male who began   having right-sided flank pain on a flight from [place] yesterday.  That became   more severe until he presented to [facility], was found to have   an 8 mm right proximal ureteral stone.  He was transferred to [facility] for   further management.  The patient denies any fevers, chills, chest pain,   shortness of breath, nausea, vomiting.  Denies any dysuria or gross hematuria.     PAST MEDICAL HISTORY:  Significant for CAD and hypertension.     PAST SURGICAL HISTORY:  CABG.     ALLERGIES:  No known drug allergies.     MEDICATIONS ON ADMISSION:  Allopurinol, aspirin, atorvastatin, carvedilol,   clopidogrel, lisinopril and tamsulosin.     REVIEW OF SYSTEMS:  See HPI; otherwise, complete review of systems is   negative.     PHYSICAL EXAMINATION:  VITAL SIGNS:  Temperature shows 36.7, pulse 60, respiratory rate 17, blood   pressure 186/94, saturations 97% on room air.  GENERAL:  The patient is alert, in no acute distress.  LUNGS:  Breathing is nonlabored.  CARDIOVASCULAR:  Pulse is regular.  ABDOMEN:  Tender in the right CVA and no tenderness on the left side.  GENITOURINARY:  Shows a circumcised phallus, no lesions.  Bilaterally testes   are down, no masses.  EXTREMITIES:  The patient moves all extremities normally.  NEUROLOGIC:  Grossly intact.     LABORATORY DATA:  White blood cell count is 13.8, hemoglobin 16.0, hematocrit   46% and platelets 273.  Sodium 139, potassium 3.8, chloride 106, bicarbonate   21, BUN 19, creatinine 0.9 with a glucose of 145.  Urinalysis shows small   leukoesterase, positive nitrites and moderate blood.     DIAGNOSTIC DATA:  CT of the abdomen and pelvis shows an 8  mm proximal right   ureteral stone with moderate hydro.     ASSESSMENT AND PLAN:  This is a 73-year-old male with an 8 mm obstructing   right ureteral stone, admit with UTI.     PLAN:  Continue broad-spectrum antibiotics.  Go to the operating room for a   cystoscopy with possible right ureteroscopy and laser lithotripsy and stent   placement.        ______________________________  MD AMRIT Puente/KARINA    DD:  05/27/2023 15:17  DT:  05/27/2023 18:28    Job#:  [id number]

## 2023-05-28 NOTE — PROGRESS NOTES
[facility] Medicine Daily Progress Note    Date of Service  5/28/2023    Chief Complaint  Warren Coyne is a 73 y.o. male admitted 5/27/2023 with flank pain.    Hospital Course  Warren Coyne is a 73 y.o. male past medical history of CAD status post CABG on aspirin/Plavix, hypertension who presented 5/27/2023 with right flank pain that started around 4 PM while on plane supposed to be visiting [facility] brought in to [facility].  Patient reports having prostate issues and has incomplete bladder emptying.  He denies any fevers, chills, nausea or vomiting.  Denies any blood in the urine.  No other relieving or exacerbate factors are noted.      In the ER CT renal study done at outside facility showing right 8 mm proximal ureteral stone and slight irregularity posterior bladder wall thickening and mass effect of enlarged prostate gland questioning bladder base mass recommending cystoscopy by radiology read.  Urinalysis positive for infection nitrate positive given Rocephin prior to arrival.  He is hemodynamically stable.  WBC outside facility 13.8.  Discussed with urology on-call plan for stent insertion in a.m.  We will keep patient n.p.o.  Admitted to medicine service.    Interval Problem Update  5/27 T-max 99, vitals are stable, on 2 L nasal cannula.  Patient has developed chills and does have slight fever of 99.  White count is slightly elevated at 13.  Continue IV Rocephin and monitor cultures.  Lactic acid is 2.5.    5/28 afebrile, vitals are stable, on room air-no longer needing oxygen-I personally checked patient at bedside.  White count jumped today up to 23, blood cultures growing gram-positive cocci on 1 set of 2.  Discussed the case with infectious disease who recommended ordering another set of blood cultures, no need to see patient.  Ordered urine culture.    I have discussed this patient's plan of care and discharge plan at IDT rounds today with Case Management, Nursing,  Nursing leadership, and other members of the IDT team.    Consultants/Specialty  urology    Code Status  Full Code    Disposition  The patient is not medically cleared for discharge to home or a post-acute facility.  Anticipate discharge to: home with close outpatient follow-up    I have placed the appropriate orders for post-discharge needs.    Review of Systems  Review of Systems   Constitutional:  Positive for chills. Negative for fever and malaise/fatigue.   Respiratory:  Negative for cough and shortness of breath.    Cardiovascular:  Negative for chest pain, palpitations and leg swelling.   Gastrointestinal:  Negative for abdominal pain, diarrhea, heartburn, nausea and vomiting.   Genitourinary:  Positive for flank pain (improved). Negative for dysuria, frequency and urgency.   Neurological:  Negative for dizziness and headaches.   All other systems reviewed and are negative.       Physical Exam  Temp:  [36.2 °C (97.2 °F)-37.5 °C (99.5 °F)] 36.6 °C (97.8 °F)  Pulse:  [] 53  Resp:  [15-20] 19  BP: (109-143)/(55-94) 124/68  SpO2:  [85 %-97 %] 97 %    Physical Exam  Vitals and nursing note reviewed.   Constitutional:       General: He is awake.      Appearance: Normal appearance. He is not ill-appearing.   HENT:      Head: Normocephalic and atraumatic.      Jaw: There is normal jaw occlusion.      Right Ear: Hearing normal.      Left Ear: Hearing normal.      Nose: Nose normal.      Mouth/Throat:      Lips: Pink.      Mouth: Mucous membranes are moist.   Eyes:      Extraocular Movements: Extraocular movements intact.      Conjunctiva/sclera: Conjunctivae normal.      Pupils: Pupils are equal, round, and reactive to light.   Neck:      Vascular: No carotid bruit.   Cardiovascular:      Rate and Rhythm: Normal rate and regular rhythm.      Pulses: Normal pulses.      Heart sounds: Normal heart sounds, S1 normal and S2 normal.   Pulmonary:      Effort: Pulmonary effort is normal.      Breath sounds: Normal  breath sounds and air entry. No stridor.   Abdominal:      General: Bowel sounds are normal.      Palpations: Abdomen is soft.      Tenderness: There is no abdominal tenderness. There is right CVA tenderness.   Musculoskeletal:      Cervical back: Normal range of motion and neck supple.      Right lower leg: No edema.      Left lower leg: No edema.   Skin:     General: Skin is warm and dry.      Capillary Refill: Capillary refill takes less than 2 seconds.   Neurological:      General: No focal deficit present.      Mental Status: He is alert and oriented to person, place, and time. Mental status is at baseline.      Sensory: Sensation is intact.      Motor: Motor function is intact.   Psychiatric:         Attention and Perception: Attention and perception normal.         Mood and Affect: Mood and affect normal.         Speech: Speech normal.         Behavior: Behavior normal. Behavior is cooperative.         Fluids    Intake/Output Summary (Last 24 hours) at 5/28/2023 0929  Last data filed at 5/27/2023 2000  Gross per 24 hour   Intake 1140 ml   Output 250 ml   Net 890 ml       Laboratory  Recent Labs     05/26/23 2218 05/27/23  0904 05/28/23  0722   WBC 13.8* 14.5* 23.0*   RBC 5.55 5.16 4.50*   HEMOGLOBIN 16.0 15.4 13.4*   HEMATOCRIT 46.6 45.2 39.0*   MCV 84.0 87.6 86.7   MCH 28.8 29.8 29.8   MCHC 34.3 34.1 34.4   RDW 13.1 41.8 42.1   PLATELETCT 273 203 177   MPV 9.2 9.0 9.8     Recent Labs     05/26/23 2218 05/27/23  0904 05/28/23  0722   SODIUM 139 142 138   POTASSIUM 3.8 4.4 4.4   CHLORIDE 106 105 105   CO2 21* 21 20   GLUCOSE 145* 137* 153*   BUN 19 22 26*   CREATININE 0.9 1.17 1.09   CALCIUM 10.0 9.2 8.4*     Recent Labs     05/27/23  0904   INR 1.02               Imaging  CT-FOREIGN FILM CAT SCAN   Final Result      DX-CYSTO FLUORO > 1 HOUR    (Results Pending)        Assessment/Plan  * Right ureteral stone  Assessment & Plan  Approximately right proximal 8 mm stone resulting in moderate  obstruction  Received Rocephin prior to arrival.  UA nitrate positive continue with IV Rocephin.   Urine culture brewing.  5/27 Urology took patient for cystoscopy and stent placement  5/28 Restarted asa/plavix    Bacteremia  Assessment & Plan  - 1 set (out of 2) of blood cultures growing gram-positive cocci  -Possible contaminant?  However white count did jump up almost 10 points night.  Discussed the case with ID, they recommended ordering another set of blood cultures-no need to see patient.    Acute UTI  Assessment & Plan  Rocephin q24 hourly   Urine culture NGTD    Bladder wall thickening  Assessment & Plan  CT renal showing slight irregular posterior bladder wall thickening mass effect from enlarged prostate gland questioning bladder base mass recommending cystoscopy  5/27 Urology consulted and took patient for cystoscopy and stent placement    Coronary artery disease  Assessment & Plan  Coronary artery disease status post CABG   Hold dual antiplatelet therapy  Continue with Coreg and lisinopril      Hypertension  Assessment & Plan  Resume Coreg and lisinopril  IV hydralazine as needed    Obesity (BMI 30-39.9)  Assessment & Plan  BMI 34.47           VTE prophylaxis: therapeutic anticoagulation with plavix/asa    I have performed a physical exam and reviewed and updated ROS and Plan today (5/28/2023). In review of yesterday's note (5/27/2023), there are no changes except as documented above.

## 2023-05-28 NOTE — PROGRESS NOTES
Patient received in bed he is alert and oriented x 4, able to make his needs known. Weaned from oxygen 5L and now on room air spO2 at 95%, denies any SOB or discomfort. Given Golytely as ordered for constipation. No BM at this time. All needs attended. Kept comfortable and safety maintained.  Patient has one large BM as reportted by Family tyson bedside.

## 2023-05-28 NOTE — ASSESSMENT & PLAN NOTE
- 1 set (out of 2) of blood cultures growing gram-positive cocci  -Possible contaminant?  However white count did jump up almost 10 points night.  Discussed the case with ID, they recommended ordering another set of blood cultures-no need to see patient.

## 2023-05-28 NOTE — CARE PLAN
Problem: Knowledge Deficit - Standard  Goal: Patient and family/care givers will demonstrate understanding of plan of care, disease process/condition, diagnostic tests and medications  Note: Education provided regarding pain management including nonpharmacological measures such as repositioning, rest and relaxation   The patient is Stable - Low risk of patient condition declining or worsening    Shift Goals  Clinical Goals: pain control  Patient Goals: pain control  Family Goals: yandel    Progress made toward(s) clinical / shift goals:      Patient is not progressing towards the following goals:

## 2023-05-29 ENCOUNTER — PHARMACY VISIT (OUTPATIENT)
Dept: PHARMACY | Facility: MEDICAL CENTER | Age: 73
End: 2023-05-29
Payer: COMMERCIAL

## 2023-05-29 VITALS
DIASTOLIC BLOOD PRESSURE: 74 MMHG | BODY MASS INDEX: 34.47 KG/M2 | TEMPERATURE: 98.7 F | OXYGEN SATURATION: 91 % | SYSTOLIC BLOOD PRESSURE: 139 MMHG | HEART RATE: 78 BPM | RESPIRATION RATE: 18 BRPM | WEIGHT: 247.14 LBS

## 2023-05-29 LAB
ANION GAP SERPL CALC-SCNC: 13 MMOL/L (ref 7–16)
BACTERIA BLD CULT: ABNORMAL
BACTERIA BLD CULT: ABNORMAL
BUN SERPL-MCNC: 21 MG/DL (ref 8–22)
CALCIUM SERPL-MCNC: 8.3 MG/DL (ref 8.5–10.5)
CHLORIDE SERPL-SCNC: 107 MMOL/L (ref 96–112)
CO2 SERPL-SCNC: 21 MMOL/L (ref 20–33)
CREAT SERPL-MCNC: 0.9 MG/DL (ref 0.5–1.4)
ERYTHROCYTE [DISTWIDTH] IN BLOOD BY AUTOMATED COUNT: 43 FL (ref 35.9–50)
GFR SERPLBLD CREATININE-BSD FMLA CKD-EPI: 89 ML/MIN/1.73 M 2
GLUCOSE SERPL-MCNC: 138 MG/DL (ref 65–99)
HCT VFR BLD AUTO: 38.4 % (ref 42–52)
HGB BLD-MCNC: 12.9 G/DL (ref 14–18)
MCH RBC QN AUTO: 29.2 PG (ref 27–33)
MCHC RBC AUTO-ENTMCNC: 33.6 G/DL (ref 32.3–36.5)
MCV RBC AUTO: 86.9 FL (ref 81.4–97.8)
PLATELET # BLD AUTO: 191 K/UL (ref 164–446)
PMV BLD AUTO: 9.2 FL (ref 9–12.9)
POTASSIUM SERPL-SCNC: 4 MMOL/L (ref 3.6–5.5)
RBC # BLD AUTO: 4.42 M/UL (ref 4.7–6.1)
SIGNIFICANT IND 70042: ABNORMAL
SITE SITE: ABNORMAL
SODIUM SERPL-SCNC: 141 MMOL/L (ref 135–145)
SOURCE SOURCE: ABNORMAL
WBC # BLD AUTO: 12.2 K/UL (ref 4.8–10.8)

## 2023-05-29 PROCEDURE — 700111 HCHG RX REV CODE 636 W/ 250 OVERRIDE (IP): Performed by: STUDENT IN AN ORGANIZED HEALTH CARE EDUCATION/TRAINING PROGRAM

## 2023-05-29 PROCEDURE — 99238 HOSP IP/OBS DSCHRG MGMT 30/<: CPT | Mod: FS | Performed by: HOSPITALIST

## 2023-05-29 PROCEDURE — A9270 NON-COVERED ITEM OR SERVICE: HCPCS | Performed by: STUDENT IN AN ORGANIZED HEALTH CARE EDUCATION/TRAINING PROGRAM

## 2023-05-29 PROCEDURE — 700102 HCHG RX REV CODE 250 W/ 637 OVERRIDE(OP)

## 2023-05-29 PROCEDURE — A9270 NON-COVERED ITEM OR SERVICE: HCPCS

## 2023-05-29 PROCEDURE — 85027 COMPLETE CBC AUTOMATED: CPT

## 2023-05-29 PROCEDURE — 700102 HCHG RX REV CODE 250 W/ 637 OVERRIDE(OP): Performed by: STUDENT IN AN ORGANIZED HEALTH CARE EDUCATION/TRAINING PROGRAM

## 2023-05-29 PROCEDURE — 80048 BASIC METABOLIC PNL TOTAL CA: CPT

## 2023-05-29 PROCEDURE — 700105 HCHG RX REV CODE 258: Performed by: STUDENT IN AN ORGANIZED HEALTH CARE EDUCATION/TRAINING PROGRAM

## 2023-05-29 PROCEDURE — RXMED WILLOW AMBULATORY MEDICATION CHARGE

## 2023-05-29 PROCEDURE — 36415 COLL VENOUS BLD VENIPUNCTURE: CPT

## 2023-05-29 RX ORDER — OXYCODONE HYDROCHLORIDE 5 MG/1
5 TABLET ORAL EVERY 6 HOURS PRN
Qty: 20 TABLET | Refills: 0 | Status: SHIPPED | OUTPATIENT
Start: 2023-05-29 | End: 2023-06-03

## 2023-05-29 RX ORDER — AMOXICILLIN 250 MG
2 CAPSULE ORAL 2 TIMES DAILY
Qty: 30 TABLET | Refills: 0 | Status: SHIPPED | OUTPATIENT
Start: 2023-05-29

## 2023-05-29 RX ORDER — TAMSULOSIN HYDROCHLORIDE 0.4 MG/1
0.4 CAPSULE ORAL DAILY
Qty: 30 CAPSULE | Refills: 0 | Status: SHIPPED | OUTPATIENT
Start: 2023-05-29

## 2023-05-29 RX ORDER — CEFDINIR 300 MG/1
300 CAPSULE ORAL 2 TIMES DAILY
Qty: 14 CAPSULE | Refills: 0 | Status: ACTIVE | OUTPATIENT
Start: 2023-05-29 | End: 2023-06-05

## 2023-05-29 RX ADMIN — ASPIRIN 81 MG 81 MG: 81 TABLET ORAL at 05:24

## 2023-05-29 RX ADMIN — CLOPIDOGREL BISULFATE 75 MG: 75 TABLET ORAL at 05:24

## 2023-05-29 RX ADMIN — CARVEDILOL 3.12 MG: 3.12 TABLET, FILM COATED ORAL at 08:05

## 2023-05-29 RX ADMIN — CEFTRIAXONE SODIUM 1000 MG: 10 INJECTION, POWDER, FOR SOLUTION INTRAVENOUS at 00:04

## 2023-05-29 RX ADMIN — SODIUM CHLORIDE: 9 INJECTION, SOLUTION INTRAVENOUS at 00:04

## 2023-05-29 RX ADMIN — TAMSULOSIN HYDROCHLORIDE 0.4 MG: 0.4 CAPSULE ORAL at 08:05

## 2023-05-29 RX ADMIN — ALLOPURINOL 300 MG: 100 TABLET ORAL at 05:23

## 2023-05-29 RX ADMIN — LISINOPRIL 5 MG: 5 TABLET ORAL at 05:24

## 2023-05-29 NOTE — CARE PLAN
The patient is Stable - Low risk of patient condition declining or worsening    Shift Goals  Clinical Goals: pain control  Patient Goals: pain control  Family Goals: yandel      Problem: Knowledge Deficit - Standard  Goal: Patient and family/care givers will demonstrate understanding of plan of care, disease process/condition, diagnostic tests and medications  Outcome: Met     Problem: Pain - Standard  Goal: Alleviation of pain or a reduction in pain to the patient’s comfort goal  Outcome: Met

## 2023-05-29 NOTE — DISCHARGE INSTRUCTIONS
Will need eventual right CULTS. Lives in [place] and plans to re-establish with his Urologist there with desired time frame for stone intervention of 2-3 weeks following treatment of infection. Please provide Hospital records at discharge (CT scan, op report)

## 2023-05-29 NOTE — DISCHARGE SUMMARY
Discharge Summary    CHIEF COMPLAINT ON ADMISSION  No chief complaint on file.      Reason for Admission  Infected kidney stone     Admission Date  5/27/2023    CODE STATUS  Full Code    HPI & HOSPITAL COURSE  This is a 73 y.o. male here with flank pain.   Warren Coyne is a 73 y.o. male past medical history of CAD status post CABG on aspirin/Plavix, hypertension who presented 5/27/2023 with right flank pain that started around 4 PM while on plane supposed to be visiting [facility] brought in to [facility].  Patient reports having prostate issues and has incomplete bladder emptying.  He denies any fevers, chills, nausea or vomiting.  Denies any blood in the urine.  No other relieving or exacerbate factors are noted.      In the ER CT renal study done at outside facility showing right 8 mm proximal ureteral stone and slight irregularity posterior bladder wall thickening and mass effect of enlarged prostate gland questioning bladder base mass recommending cystoscopy by radiology read.  Urinalysis positive for infection nitrate positive given Rocephin prior to arrival.  He is hemodynamically stable.  WBC outside facility 13.8.  Discussed with urology on-call plan for stent insertion in a.m.  We will keep patient n.p.o.  Admitted to medicine service.    Urology took patient for procedure on the morning of admission, they were unable to remove the stone but they did place a stent.  On the following day his white count jumped to 23 and his blood cultures (1 set) grew gram-positive cocci.  Discussed the case with infectious disease who recommended redrawing blood cultures and this was most likely a contaminant.  The following day white count trended down and the patient was feeling much better.  He will be sent home on 7 days of Omnicef and follow-up with urology in [place].  He will also be sent home with a few days of pain medications and will continue his home Flomax.    Therefore, he is  discharged in good and stable condition to home with close outpatient follow-up.    The patient met 2-midnight criteria for an inpatient stay at the time of discharge.    Discharge Date  5/29/2023    FOLLOW UP ITEMS POST DISCHARGE  PCP  Urology    DISCHARGE DIAGNOSES  Principal Problem:    Right ureteral stone (POA: Unknown)  Active Problems:    Bladder wall thickening (POA: Unknown)    Acute UTI (POA: Unknown)    Bacteremia (POA: Unknown)    Hypertension (POA: Unknown)    Coronary artery disease (POA: Unknown)    Obesity (BMI 30-39.9) (POA: Unknown)  Resolved Problems:    * No resolved hospital problems. *      FOLLOW UP  No future appointments.  No follow-up provider specified.    MEDICATIONS ON DISCHARGE     Medication List        START taking these medications        Instructions   cefdinir 300 MG Caps  Commonly known as: OMNICEF   Take 1 Capsule by mouth 2 times a day for 7 days.  Dose: 300 mg     oxyCODONE immediate-release 5 MG Tabs  Commonly known as: ROXICODONE   Take 1 Tablet by mouth every 6 hours as needed for Severe Pain for up to 5 days.  Dose: 5 mg     senna-docusate 8.6-50 MG Tabs  Commonly known as: PERICOLACE or SENOKOT S   Take 2 Tablets by mouth 2 times a day.  Dose: 2 Tablet            CHANGE how you take these medications        Instructions   * tamsulosin 0.4 MG capsule  What changed: Another medication with the same name was added. Make sure you understand how and when to take each.  Commonly known as: FLOMAX   Take 0.4 mg by mouth 1/2 hour after breakfast.  Dose: 0.4 mg     * tamsulosin 0.4 MG capsule  What changed: You were already taking a medication with the same name, and this prescription was added. Make sure you understand how and when to take each.  Commonly known as: FLOMAX   Take 1 Capsule by mouth every day.  Dose: 0.4 mg           * This list has 2 medication(s) that are the same as other medications prescribed for you. Read the directions carefully, and ask your doctor or other  care provider to review them with you.                CONTINUE taking these medications        Instructions   allopurinol 300 MG Tabs  Commonly known as: ZYLOPRIM   Take 300 mg by mouth every day.  Dose: 300 mg     aspirin 81 MG Chew chewable tablet  Commonly known as: ASA   Chew 81 mg every day.  Dose: 81 mg     atorvastatin 40 MG Tabs  Commonly known as: LIPITOR   Take 40 mg by mouth every evening.  Dose: 40 mg     carvedilol 3.125 MG Tabs  Commonly known as: COREG   Take 3.125 mg by mouth every day.  Dose: 3.125 mg     clopidogrel 75 MG Tabs  Commonly known as: PLAVIX   Take 75 mg by mouth every day.  Dose: 75 mg     lisinopril 5 MG Tabs  Commonly known as: PRINIVIL   Take 5 mg by mouth every day.  Dose: 5 mg              Allergies  No Known Allergies    DIET  Orders Placed This Encounter   Procedures    Diet Order Diet: Regular     Standing Status:   Standing     Number of Occurrences:   1     Order Specific Question:   Diet:     Answer:   Regular [1]       ACTIVITY  As tolerated.  Weight bearing as tolerated    CONSULTATIONS  Urology    PROCEDURES  Cystoscopy with stent placement    LABORATORY  Lab Results   Component Value Date    SODIUM 141 05/29/2023    POTASSIUM 4.0 05/29/2023    CHLORIDE 107 05/29/2023    CO2 21 05/29/2023    GLUCOSE 138 (H) 05/29/2023    BUN 21 05/29/2023    CREATININE 0.90 05/29/2023        Lab Results   Component Value Date    WBC 12.2 (H) 05/29/2023    HEMOGLOBIN 12.9 (L) 05/29/2023    HEMATOCRIT 38.4 (L) 05/29/2023    PLATELETCT 191 05/29/2023        Total time of the discharge process 25 minutes.

## 2023-05-29 NOTE — CARE PLAN
The patient is Stable - Low risk of patient condition declining or worsening    Shift Goals  Clinical Goals: pain control  Patient Goals: pain control  Family Goals: yandel      Problem: Knowledge Deficit - Standard  Goal: Patient and family/care givers will demonstrate understanding of plan of care, disease process/condition, diagnostic tests and medications  Outcome: Progressing     Problem: Pain - Standard  Goal: Alleviation of pain or a reduction in pain to the patient’s comfort goal  Outcome: Progressing

## 2023-05-31 LAB
BACTERIA UR CULT: NORMAL
SIGNIFICANT IND 70042: NORMAL
SITE SITE: NORMAL
SOURCE SOURCE: NORMAL

## 2023-06-01 LAB
BACTERIA BLD CULT: NORMAL
SIGNIFICANT IND 70042: NORMAL
SITE SITE: NORMAL
SOURCE SOURCE: NORMAL

## 2023-06-02 LAB
BACTERIA BLD CULT: NORMAL
BACTERIA BLD CULT: NORMAL
SIGNIFICANT IND 70042: NORMAL
SIGNIFICANT IND 70042: NORMAL
SITE SITE: NORMAL
SITE SITE: NORMAL
SOURCE SOURCE: NORMAL
SOURCE SOURCE: NORMAL

## 2023-10-12 ENCOUNTER — TELEPHONE ENCOUNTER (OUTPATIENT)
Dept: URBAN - METROPOLITAN AREA CLINIC 7 | Facility: CLINIC | Age: 73
End: 2023-10-12

## 2023-10-20 ENCOUNTER — LAB OUTSIDE AN ENCOUNTER (OUTPATIENT)
Dept: URBAN - METROPOLITAN AREA CLINIC 7 | Facility: CLINIC | Age: 73
End: 2023-10-20

## 2023-10-20 ENCOUNTER — DASHBOARD ENCOUNTERS (OUTPATIENT)
Age: 73
End: 2023-10-20

## 2023-10-20 ENCOUNTER — OFFICE VISIT (OUTPATIENT)
Dept: URBAN - METROPOLITAN AREA CLINIC 7 | Facility: CLINIC | Age: 73
End: 2023-10-20
Payer: MEDICARE

## 2023-10-20 VITALS — TEMPERATURE: 98 F | WEIGHT: 237 LBS

## 2023-10-20 DIAGNOSIS — Z95.1 HX OF CABG: ICD-10-CM

## 2023-10-20 DIAGNOSIS — Z86.010 HISTORY OF COLON POLYPS: ICD-10-CM

## 2023-10-20 DIAGNOSIS — I10 PRIMARY HYPERTENSION: ICD-10-CM

## 2023-10-20 DIAGNOSIS — E78.2 MODERATE MIXED HYPERLIPIDEMIA NOT REQUIRING STATIN THERAPY: ICD-10-CM

## 2023-10-20 PROBLEM — 59621000: Status: ACTIVE | Noted: 2023-10-20

## 2023-10-20 PROBLEM — 428283002: Status: ACTIVE | Noted: 2023-10-20

## 2023-10-20 PROBLEM — 267434003: Status: ACTIVE | Noted: 2023-10-20

## 2023-10-20 PROBLEM — 399261000: Status: ACTIVE | Noted: 2023-10-20

## 2023-10-20 PROCEDURE — 99204 OFFICE O/P NEW MOD 45 MIN: CPT | Performed by: INTERNAL MEDICINE

## 2023-10-20 RX ORDER — CEFDINIR 300 MG/1
CAPSULE ORAL
Qty: 14 CAPSULE | Status: ON HOLD | COMMUNITY

## 2023-10-20 RX ORDER — ATORVASTATIN CALCIUM 40 MG/1
TABLET, FILM COATED ORAL
Qty: 90 TABLET | Status: ON HOLD | COMMUNITY

## 2023-10-20 RX ORDER — POTASSIUM CITRATE 15 MEQ/1
TABLET, EXTENDED RELEASE ORAL
Qty: 180 TABLET | Status: ACTIVE | COMMUNITY

## 2023-10-20 RX ORDER — ALLOPURINOL 100 MG/1
TABLET ORAL
Qty: 90 TABLET | Status: ACTIVE | COMMUNITY

## 2023-10-20 RX ORDER — TAMSULOSIN HYDROCHLORIDE 0.4 MG/1
CAPSULE ORAL
Qty: 60 CAPSULE | Status: ACTIVE | COMMUNITY

## 2023-10-20 RX ORDER — LISINOPRIL 5 MG/1
TABLET ORAL
Qty: 90 TABLET | Status: ACTIVE | COMMUNITY

## 2023-10-20 RX ORDER — CARVEDILOL 3.12 MG/1
TABLET, FILM COATED ORAL
Qty: 180 TABLET | Status: ACTIVE | COMMUNITY

## 2023-10-20 RX ORDER — ATORVASTATIN CALCIUM 80 MG/1
TABLET, FILM COATED ORAL
Qty: 45 TABLET | Status: ACTIVE | COMMUNITY

## 2023-10-20 RX ORDER — CLOPIDOGREL BISULFATE 75 MG/1
TABLET, FILM COATED ORAL
Qty: 90 TABLET | Status: ON HOLD | COMMUNITY

## 2023-10-20 RX ORDER — CIPROFLOXACIN HYDROCHLORIDE 500 MG/1
TAKE 1 TABLET BY MOUTH EVERY 12 HOURS FOR 5 DAYS TABLET, FILM COATED ORAL
Qty: 10 EACH | Refills: 0 | Status: ON HOLD | COMMUNITY

## 2023-10-20 RX ORDER — POTASSIUM CITRATE 10 MEQ/1
TABLET, EXTENDED RELEASE ORAL
Qty: 200 TABLET | Status: ON HOLD | COMMUNITY

## 2023-10-20 NOTE — HPI-TODAY'S VISIT:
Hx polyp and due for repeat  colonoscopy. Cardiopulmonary comorbidities include CAD and prior CABG. No current anticoagulation. No recent nsaid use history. No fevers or chills No nausea or vomiting No weight loss No change in bowel habits No bleeding.

## 2024-05-06 ENCOUNTER — TELEPHONE ENCOUNTER (OUTPATIENT)
Dept: URBAN - METROPOLITAN AREA CLINIC 7 | Facility: CLINIC | Age: 74
End: 2024-05-06

## 2024-07-09 ENCOUNTER — TELEPHONE ENCOUNTER (OUTPATIENT)
Dept: URBAN - METROPOLITAN AREA SURGERY CENTER 5 | Facility: SURGERY CENTER | Age: 74
End: 2024-07-09

## 2024-07-11 ENCOUNTER — OFFICE VISIT (OUTPATIENT)
Dept: URBAN - METROPOLITAN AREA SURGERY CENTER 5 | Facility: SURGERY CENTER | Age: 74
End: 2024-07-11

## (undated) DEVICE — SODIUM CHL. IRRIGATION 0.9% 3000ML (4EA/CA 65CA/PF)

## (undated) DEVICE — SET EXTENSION WITH 2 PORTS (48EA/CA) ***PART #2C8610 IS A SUBSTITUTE*****

## (undated) DEVICE — PACK CYSTO III (2EA/CA)

## (undated) DEVICE — LACTATED RINGERS INJ 1000 ML - (14EA/CA 60CA/PF)

## (undated) DEVICE — TUBING CLEARLINK DUO-VENT - C-FLO (48EA/CA)

## (undated) DEVICE — SHEATH NAVIGATOR 11/13 X 36 URETERAL ACCESS (5EA/BX)

## (undated) DEVICE — SET LEADWIRE 5 LEAD BEDSIDE DISPOSABLE ECG (1SET OF 5/EA)

## (undated) DEVICE — SCOPE DIGITAL URETEROSCOPE DISPOSABLE (10EA/PK)

## (undated) DEVICE — BAG URODRAIN WITH TUBING - (20/CA)

## (undated) DEVICE — COVER FOOT UNIVERSAL DISP. - (25EA/CA)

## (undated) DEVICE — GLOVE BIOGEL SZ 7.5 SURGICAL PF LTX - (50PR/BX 4BX/CA)

## (undated) DEVICE — SPONGE GAUZESTER 4 X 4 4PLY - (128PK/CA)

## (undated) DEVICE — COVER LIGHT HANDLE ALC PLUS DISP (18EA/BX)

## (undated) DEVICE — SUCTION INSTRUMENT YANKAUER BULBOUS TIP W/O VENT (50EA/CA)

## (undated) DEVICE — SENSOR OXIMETER ADULT SPO2 RD SET (20EA/BX)

## (undated) DEVICE — CANISTER SUCTION 3000ML MECHANICAL FILTER AUTO SHUTOFF MEDI-VAC NONSTERILE LF DISP  (40EA/CA)

## (undated) DEVICE — WATER IRRIGATION STERILE 1000ML (12EA/CA)

## (undated) DEVICE — CONNECTOR HOSE NEPTUNE FOR CYSTO ROOM

## (undated) DEVICE — WATER IRRIG. STER 3000 ML - (4/CA)

## (undated) DEVICE — CONTAINER SPECIMEN BAG OR - STERILE 4 OZ W/LID (100EA/CA)

## (undated) DEVICE — WIRE GUIDE SENSOR DUAL FLEX - 5/BX

## (undated) DEVICE — GOWN WARMING STANDARD FLEX - (30/CA)